# Patient Record
Sex: MALE | Race: WHITE | NOT HISPANIC OR LATINO | Employment: FULL TIME | URBAN - METROPOLITAN AREA
[De-identification: names, ages, dates, MRNs, and addresses within clinical notes are randomized per-mention and may not be internally consistent; named-entity substitution may affect disease eponyms.]

---

## 2024-04-01 ENCOUNTER — HOSPITAL ENCOUNTER (EMERGENCY)
Facility: HOSPITAL | Age: 19
Discharge: HOME | End: 2024-04-01
Attending: EMERGENCY MEDICINE
Payer: MEDICAID

## 2024-04-01 VITALS
RESPIRATION RATE: 20 BRPM | HEIGHT: 71 IN | BODY MASS INDEX: 34.3 KG/M2 | DIASTOLIC BLOOD PRESSURE: 92 MMHG | TEMPERATURE: 98 F | OXYGEN SATURATION: 97 % | SYSTOLIC BLOOD PRESSURE: 151 MMHG | HEART RATE: 92 BPM | WEIGHT: 245 LBS

## 2024-04-01 DIAGNOSIS — N50.812 PAIN IN BOTH TESTICLES: Primary | ICD-10-CM

## 2024-04-01 DIAGNOSIS — N50.811 PAIN IN BOTH TESTICLES: Primary | ICD-10-CM

## 2024-04-01 LAB
BILIRUB UR QL STRIP: NEGATIVE
CLARITY UR REFRACT.AUTO: CLEAR
COLOR UR AUTO: YELLOW
GLUCOSE UR QL STRIP: NEGATIVE
HGB UR QL STRIP: NEGATIVE
KETONES UR QL STRIP: ABNORMAL
LEUKOCYTE ESTERASE UR QL STRIP: NEGATIVE
NITRITE UR QL STRIP: NEGATIVE
PH UR STRIP: 6 [PH] (ref 5–8)
PROT UR QL STRIP: NEGATIVE
SP GR UR STRIP: 1.02 (ref 1–1.03)
URN SPEC COLLECT METH UR: ABNORMAL
UROBILINOGEN UR STRIP-ACNC: NEGATIVE EU/DL

## 2024-04-01 PROCEDURE — 81003 URINALYSIS AUTO W/O SCOPE: CPT | Mod: ER | Performed by: EMERGENCY MEDICINE

## 2024-04-01 PROCEDURE — 99282 EMERGENCY DEPT VISIT SF MDM: CPT | Mod: ER

## 2024-04-01 RX ORDER — CYCLOBENZAPRINE HCL 10 MG
10 TABLET ORAL EVERY 8 HOURS PRN
COMMUNITY
Start: 2024-03-29

## 2024-04-01 RX ORDER — IBUPROFEN 800 MG/1
800 TABLET ORAL EVERY 6 HOURS PRN
COMMUNITY
Start: 2024-03-29

## 2024-04-01 NOTE — ED PROVIDER NOTES
"Encounter Date: 4/1/2024       History     Chief Complaint   Patient presents with    Testicle Pain     Lifted box Thursday and felt pain in testicles in lower abd. Went to [place]- was told it was a strain after ultrasound and Ct. PT reports today "my testicles went up"     Patient is a 18 y.o. male who has no significant past medical history presenting to the Emergency Department for testicular pain.  Patient states that he was lifting a box 4 days ago at work when he felt pain in his testicles, worse on left side, and lower abdomen.  He went to [facility] and had benign ultrasound and CT done.  Diagnosed with strain.  States that today while he was at work, he felt as if "his testicles went up." He works in a cold freezer.  Symptoms are slowly resolving spontaneously.  Endorses some incomplete emptying of urine.  No testicular swelling, penile pain, or penile discharge. No dysuria or hematuria. No treatment attempted PTA.  Patient not concerned for STDs at this time.    The history is provided by the patient. No  was used.     Review of patient's allergies indicates:  No Known Allergies  History reviewed. No pertinent past medical history.  Past Surgical History:   Procedure Laterality Date    APPENDECTOMY      HIP SURGERY       History reviewed. No pertinent family history.  Social History     Tobacco Use    Smoking status: Never    Smokeless tobacco: Never   Substance Use Topics    Alcohol use: Never    Drug use: Never     Review of Systems   Constitutional:  Negative for chills, diaphoresis, fatigue and fever.   HENT:  Negative for congestion, sore throat and trouble swallowing.    Respiratory:  Negative for cough and shortness of breath.    Cardiovascular:  Negative for chest pain and palpitations.   Gastrointestinal:  Negative for abdominal pain, blood in stool, constipation, diarrhea, nausea and vomiting.   Genitourinary:  Positive for penile pain (discomfort). Negative for " difficulty urinating, dysuria, frequency, hematuria, penile discharge, penile swelling, scrotal swelling and testicular pain.        + Urinary retention   Musculoskeletal:  Negative for back pain and myalgias.   Skin:  Negative for rash and wound.   Neurological:  Negative for weakness, light-headedness, numbness and headaches.       Physical Exam     Initial Vitals [04/01/24 1344]   BP Pulse Resp Temp SpO2   (!) 151/92 92 20 97.9 °F (36.6 °C) 97 %      MAP       --         Physical Exam    Nursing note and vitals reviewed.  Constitutional: He appears well-developed and well-nourished. He is not diaphoretic. No distress.   Patient well-appearing.  Awake and alert.  No acute distress.  Maintaining airway appropriately.  Speaking in complete sentences.   HENT:   Head: Normocephalic and atraumatic.   Right Ear: External ear normal.   Left Ear: External ear normal.   Eyes: Conjunctivae and EOM are normal. Pupils are equal, round, and reactive to light.   Neck: Neck supple.   Normal range of motion.  Pulmonary/Chest: No respiratory distress.   Abdominal: Hernia confirmed negative in the right inguinal area and confirmed negative in the left inguinal area.   Genitourinary:    Testes normal.   Cremasteric reflex is present. Right testis shows no mass, no swelling and no tenderness. Right testis is descended. Cremasteric reflex is not absent on the right side. Left testis shows no mass, no swelling and no tenderness. Left testis is descended. Cremasteric reflex is not absent on the left side. No penile erythema or penile tenderness. No discharge found.    Genitourinary Comments:  exam done with nurse in the room.     Musculoskeletal:         General: No tenderness or edema. Normal range of motion.      Cervical back: Normal range of motion and neck supple.     Neurological: He is alert and oriented to person, place, and time. He has normal strength.   Skin: Skin is warm. Capillary refill takes less than 2 seconds.    Psychiatric: He has a normal mood and affect. His behavior is normal. Thought content normal.         ED Course   Procedures  Labs Reviewed   URINALYSIS, REFLEX TO URINE CULTURE - Abnormal; Notable for the following components:       Result Value    Ketones, UA 1+ (*)     All other components within normal limits    Narrative:     Preferred Collection Type->Urine, Clean Catch  Specimen Source->Urine          Imaging Results    None          Medications - No data to display  Medical Decision Making  Patient presents to emergency room testicular pain. Vital signs stable.  Physical exam stated above.    Differential Diagnosis includes, but is not limited to STI, urethritis, testicular/appendix torsion, epididymitis, orchitis, UTI, kidney stone, urinary retention, appendicitis, prostatitis, testicular mass/neoplasm, or incarcerated/strangulated hernia.  Patient without concern for STI at this time.  He is afebrile.  Unlikely prostatitis.  No testicular pain.  Cremasteric reflex present.  I do not suspect testicular torsion.  No right lower quadrant abdominal pain that would suggest appendicitis.  No hernia felt on exam. UA without UTI. Clinical presentation and physical exam most suggestive of muscle strain. Referral placed to urology.    I see no indication of an emergent process beyond that addressed during our encounter. Patient stable for discharge at this time. I have counseled the patient regarding follow up with urology and gave strict return precautions. I have discussed the final diagnosis and gave instructions regarding previously prescribed medications, Flexeril and Ibuprofen. Patient verbalized understanding and is agreeable.     Amount and/or Complexity of Data Reviewed  Labs:  Decision-making details documented in ED Course.  Radiology:      Details: Considered ordering ultrasound however, patient ultrasound done at outside facility 4 days ago.  States that this was a testicular ultrasound.  He was  discharged without any acute abnormalities.  I do not believe there would be any significant changes.  ECG/medicine tests:      Details: Considered ordering EKG, though patient without any chest pain, palpitations, leg swelling, or SOB at this time.     Risk  OTC drugs.  Risk Details: Comorbidities taken into consideration during the patient's evaluation and treatment include none.    Social determinants of health taken into consideration during development of our treatment plan include difficulty in obtaining follow-up and obtaining medications; health literacy.               ED Course as of 04/01/24 1525   Mon Apr 01, 2024   1445 Urinalysis, Reflex to Urine Culture Urine, Clean Catch(!)  Urinalysis without leukocytes or occult blood.  1+ ketones. [BJ]      ED Course User Index  [BJ] Gilma Tong PA-C                           Clinical Impression:  Final diagnoses:  [N50.811, N50.812] Pain in both testicles (Primary)          ED Disposition Condition    Discharge Stable          ED Prescriptions    None       Follow-up Information       Follow up With Specialties Details Why Contact Info Additional Information    Your doctor  Schedule an appointment as soon as possible for a visit        [facility]   [address]  [address]  [facility] [address] [phone] Please park in [address] or [address] and use Saul soliz. Take [facility] Building elevators.             Gilma Tong PA-C  04/01/24 0683

## 2024-04-01 NOTE — DISCHARGE INSTRUCTIONS
Thank you for allowing me and my emergency team to take care of you here today! I hope you feel better soon. Please do not hesitate to return with any additional concerns that may arise from this or any new problem you encounter.    Our goal in the emergency department is to always give you outstanding care and exceptional service. If you receive a survey by mail or e-mail in the next week regarding your experience in our ED, we would greatly appreciate you completing it. Your feedback provides us with a way to recognize our staff who give very good care and it helps us learn how to improve when your experience was below the excellence we aspire to be!    Brook Juneau, PA-C [facility], [facility], and [facility]   Emergency Room Physician Assistant

## 2024-04-01 NOTE — Clinical Note
"Kaden"Kaden"CalvinElisha was seen and treated in our emergency department on 4/1/2024.  He may return with limitations on 04/02/2024.  You may return to work with limited lifting (less than 15 lbs) and return with normal activity as you can tolerate.      Sincerely,      Gilma Tong PA-C    "